# Patient Record
Sex: FEMALE | Race: WHITE | NOT HISPANIC OR LATINO | Employment: FULL TIME | URBAN - METROPOLITAN AREA
[De-identification: names, ages, dates, MRNs, and addresses within clinical notes are randomized per-mention and may not be internally consistent; named-entity substitution may affect disease eponyms.]

---

## 2017-02-03 ENCOUNTER — COMMUNICATION - HEALTHEAST (OUTPATIENT)
Dept: INTERNAL MEDICINE | Facility: CLINIC | Age: 52
End: 2017-02-03

## 2017-02-06 ENCOUNTER — RECORDS - HEALTHEAST (OUTPATIENT)
Dept: ADMINISTRATIVE | Facility: OTHER | Age: 52
End: 2017-02-06

## 2017-02-17 ENCOUNTER — HOSPITAL ENCOUNTER (OUTPATIENT)
Dept: MAMMOGRAPHY | Facility: CLINIC | Age: 52
Discharge: HOME | End: 2017-02-17
Attending: INTERNAL MEDICINE

## 2017-02-17 DIAGNOSIS — Z12.31 VISIT FOR SCREENING MAMMOGRAM: ICD-10-CM

## 2017-03-17 ENCOUNTER — OFFICE VISIT - HEALTHEAST (OUTPATIENT)
Dept: INTERNAL MEDICINE | Facility: CLINIC | Age: 52
End: 2017-03-17

## 2017-03-17 ENCOUNTER — RECORDS - HEALTHEAST (OUTPATIENT)
Dept: ADMINISTRATIVE | Facility: OTHER | Age: 52
End: 2017-03-17

## 2017-03-17 DIAGNOSIS — Z00.00 ROUTINE GENERAL MEDICAL EXAMINATION AT A HEALTH CARE FACILITY: ICD-10-CM

## 2017-03-17 LAB
CHOLEST SERPL-MCNC: 163 MG/DL
FASTING STATUS PATIENT QL REPORTED: YES
HDLC SERPL-MCNC: 64 MG/DL
LDLC SERPL CALC-MCNC: 89 MG/DL
TRIGL SERPL-MCNC: 52 MG/DL

## 2017-03-17 ASSESSMENT — MIFFLIN-ST. JEOR: SCORE: 1081.44

## 2017-03-23 LAB
BKR LAB AP ABNORMAL BLEEDING: NO
BKR LAB AP BIRTH CONTROL/HORMONES: NORMAL
BKR LAB AP CERVICAL APPEARANCE: NORMAL
BKR LAB AP GYN ADEQUACY: NORMAL
BKR LAB AP GYN INTERPRETATION: NORMAL
BKR LAB AP GYN OTHER FINDINGS: NORMAL
BKR LAB AP HPV REFLEX: NORMAL
BKR LAB AP LMP: NORMAL
BKR LAB AP PATIENT STATUS: NORMAL
BKR LAB AP PREVIOUS ABNORMAL: NORMAL
BKR LAB AP PREVIOUS NORMAL: NORMAL
HIGH RISK?: NO
HPV INTERPRETATION - HISTORICAL: NORMAL
HPV INTERPRETER - HISTORICAL: NORMAL
PATH REPORT.COMMENTS IMP SPEC: NORMAL
RESULT FLAG (HE HISTORICAL CONVERSION): NORMAL

## 2017-11-15 ENCOUNTER — OFFICE VISIT - HEALTHEAST (OUTPATIENT)
Dept: PODIATRY | Facility: CLINIC | Age: 52
End: 2017-11-15

## 2017-11-15 DIAGNOSIS — M72.2 PLANTAR FASCIITIS: ICD-10-CM

## 2017-11-16 ENCOUNTER — COMMUNICATION - HEALTHEAST (OUTPATIENT)
Dept: OTHER | Facility: CLINIC | Age: 52
End: 2017-11-16

## 2018-03-01 ENCOUNTER — HOSPITAL ENCOUNTER (OUTPATIENT)
Dept: MAMMOGRAPHY | Facility: CLINIC | Age: 53
Discharge: HOME | End: 2018-03-01
Attending: INTERNAL MEDICINE

## 2018-03-01 DIAGNOSIS — Z12.31 VISIT FOR SCREENING MAMMOGRAM: ICD-10-CM

## 2019-03-21 ENCOUNTER — HOSPITAL ENCOUNTER (OUTPATIENT)
Dept: MAMMOGRAPHY | Facility: CLINIC | Age: 54
Discharge: HOME | End: 2019-03-21
Attending: OBSTETRICS & GYNECOLOGY

## 2019-03-21 DIAGNOSIS — Z12.31 VISIT FOR SCREENING MAMMOGRAM: ICD-10-CM

## 2021-04-16 ENCOUNTER — HOSPITAL ENCOUNTER (OUTPATIENT)
Dept: MAMMOGRAPHY | Facility: CLINIC | Age: 56
Discharge: HOME | End: 2021-04-16
Attending: OBSTETRICS & GYNECOLOGY

## 2021-04-16 DIAGNOSIS — Z12.31 VISIT FOR SCREENING MAMMOGRAM: ICD-10-CM

## 2021-04-22 ENCOUNTER — RECORDS - HEALTHEAST (OUTPATIENT)
Dept: LAB | Facility: CLINIC | Age: 56
End: 2021-04-22

## 2021-04-22 LAB
CHOLEST SERPL-MCNC: 152 MG/DL
FASTING STATUS PATIENT QL REPORTED: NORMAL
HDLC SERPL-MCNC: 60 MG/DL
LDLC SERPL CALC-MCNC: 77 MG/DL
TRIGL SERPL-MCNC: 77 MG/DL

## 2021-04-23 LAB
25(OH)D3 SERPL-MCNC: 33.8 NG/ML (ref 30–80)
HPV SOURCE: NORMAL
HUMAN PAPILLOMA VIRUS 16 DNA: NEGATIVE
HUMAN PAPILLOMA VIRUS 18 DNA: NEGATIVE
HUMAN PAPILLOMA VIRUS FINAL DIAGNOSIS: NORMAL
HUMAN PAPILLOMA VIRUS OTHER HR: NEGATIVE
SPECIMEN DESCRIPTION: NORMAL

## 2021-04-28 LAB
BKR LAB AP ABNORMAL BLEEDING: NO
BKR LAB AP BIRTH CONTROL/HORMONES: NORMAL
BKR LAB AP CERVICAL APPEARANCE: NORMAL
BKR LAB AP GYN ADEQUACY: NORMAL
BKR LAB AP GYN INTERPRETATION: NORMAL
BKR LAB AP HPV REFLEX: NORMAL
BKR LAB AP LMP: NORMAL
BKR LAB AP PATIENT STATUS: NO
BKR LAB AP PREVIOUS ABNORMAL: NO
BKR LAB AP PREVIOUS NORMAL: 2019
HIGH RISK?: NO
PATH REPORT.COMMENTS IMP SPEC: NORMAL
RESULT FLAG (HE HISTORICAL CONVERSION): NORMAL

## 2021-05-24 ENCOUNTER — RECORDS - HEALTHEAST (OUTPATIENT)
Dept: ADMINISTRATIVE | Facility: CLINIC | Age: 56
End: 2021-05-24

## 2021-05-28 ENCOUNTER — RECORDS - HEALTHEAST (OUTPATIENT)
Dept: ADMINISTRATIVE | Facility: CLINIC | Age: 56
End: 2021-05-28

## 2021-05-29 ENCOUNTER — RECORDS - HEALTHEAST (OUTPATIENT)
Dept: ADMINISTRATIVE | Facility: CLINIC | Age: 56
End: 2021-05-29

## 2021-05-30 VITALS — BODY MASS INDEX: 20.71 KG/M2 | WEIGHT: 116.9 LBS | HEIGHT: 63 IN

## 2021-06-14 ENCOUNTER — COMMUNICATION - HEALTHEAST (OUTPATIENT)
Dept: PHYSICAL MEDICINE AND REHAB | Facility: CLINIC | Age: 56
End: 2021-06-14

## 2021-06-14 ENCOUNTER — HOSPITAL ENCOUNTER (EMERGENCY)
Dept: EMERGENCY MEDICINE | Facility: CLINIC | Age: 56
Discharge: HOME | End: 2021-06-14
Attending: STUDENT IN AN ORGANIZED HEALTH CARE EDUCATION/TRAINING PROGRAM
Payer: COMMERCIAL

## 2021-06-14 DIAGNOSIS — M54.2 NECK PAIN: ICD-10-CM

## 2021-06-14 DIAGNOSIS — M48.02 CERVICAL STENOSIS OF SPINAL CANAL: ICD-10-CM

## 2021-06-14 ASSESSMENT — MIFFLIN-ST. JEOR: SCORE: 1045.61

## 2021-06-15 ENCOUNTER — COMMUNICATION - HEALTHEAST (OUTPATIENT)
Dept: PHYSICAL MEDICINE AND REHAB | Facility: CLINIC | Age: 56
End: 2021-06-15

## 2021-06-15 ENCOUNTER — HOSPITAL ENCOUNTER (OUTPATIENT)
Dept: PHYSICAL MEDICINE AND REHAB | Facility: CLINIC | Age: 56
Discharge: HOME | End: 2021-06-15
Attending: PHYSICAL MEDICINE & REHABILITATION
Payer: COMMERCIAL

## 2021-06-15 DIAGNOSIS — M48.02 CERVICAL STENOSIS OF SPINAL CANAL: ICD-10-CM

## 2021-06-15 DIAGNOSIS — M54.12 CERVICAL RADICULITIS: ICD-10-CM

## 2021-06-15 DIAGNOSIS — M54.2 NECK PAIN: ICD-10-CM

## 2021-06-15 DIAGNOSIS — M48.02 FORAMINAL STENOSIS OF CERVICAL REGION: ICD-10-CM

## 2021-06-15 RX ORDER — OMEGA-3 FATTY ACIDS/FISH OIL 300-1000MG
400 CAPSULE ORAL EVERY 6 HOURS PRN
Status: SHIPPED | COMMUNITY
Start: 2021-06-15

## 2021-06-15 RX ORDER — HYDROCODONE BITARTRATE AND ACETAMINOPHEN 5; 325 MG/1; MG/1
TABLET ORAL
Qty: 21 TABLET | Refills: 0 | Status: SHIPPED | OUTPATIENT
Start: 2021-06-15 | End: 2023-02-06

## 2021-06-15 RX ORDER — NABUMETONE 500 MG/1
TABLET, FILM COATED ORAL
Qty: 90 TABLET | Refills: 1 | Status: SHIPPED | OUTPATIENT
Start: 2021-06-15

## 2021-06-15 RX ORDER — GABAPENTIN 300 MG/1
CAPSULE ORAL
Qty: 270 CAPSULE | Refills: 2 | Status: SHIPPED | OUTPATIENT
Start: 2021-06-15 | End: 2023-02-06

## 2021-06-15 ASSESSMENT — MIFFLIN-ST. JEOR: SCORE: 1041.98

## 2021-06-16 PROBLEM — N94.6 DYSMENORRHEA: Status: ACTIVE | Noted: 2019-04-19

## 2021-06-17 ENCOUNTER — COMMUNICATION - HEALTHEAST (OUTPATIENT)
Dept: PHYSICAL MEDICINE AND REHAB | Facility: CLINIC | Age: 56
End: 2021-06-17

## 2021-06-17 DIAGNOSIS — M54.12 CERVICAL RADICULITIS: ICD-10-CM

## 2021-06-18 ENCOUNTER — HOSPITAL ENCOUNTER (OUTPATIENT)
Dept: PHYSICAL MEDICINE AND REHAB | Facility: CLINIC | Age: 56
Discharge: HOME | End: 2021-06-18
Attending: PHYSICAL MEDICINE & REHABILITATION
Payer: COMMERCIAL

## 2021-06-18 DIAGNOSIS — M54.12 CERVICAL RADICULITIS: ICD-10-CM

## 2021-06-21 ENCOUNTER — COMMUNICATION - HEALTHEAST (OUTPATIENT)
Dept: PHYSICAL MEDICINE AND REHAB | Facility: CLINIC | Age: 56
End: 2021-06-21

## 2021-06-22 ENCOUNTER — HOSPITAL ENCOUNTER (OUTPATIENT)
Dept: PHYSICAL MEDICINE AND REHAB | Facility: CLINIC | Age: 56
Discharge: HOME | End: 2021-06-22
Attending: PHYSICAL MEDICINE & REHABILITATION
Payer: COMMERCIAL

## 2021-06-22 DIAGNOSIS — M48.02 FORAMINAL STENOSIS OF CERVICAL REGION: ICD-10-CM

## 2021-06-22 DIAGNOSIS — M54.12 CERVICAL RADICULITIS: ICD-10-CM

## 2021-06-22 RX ORDER — HYDROCODONE BITARTRATE AND ACETAMINOPHEN 5; 325 MG/1; MG/1
TABLET ORAL
Qty: 35 TABLET | Refills: 0 | Status: SHIPPED | OUTPATIENT
Start: 2021-06-22 | End: 2023-02-06

## 2021-06-25 ENCOUNTER — COMMUNICATION - HEALTHEAST (OUTPATIENT)
Dept: PHYSICAL MEDICINE AND REHAB | Facility: CLINIC | Age: 56
End: 2021-06-25

## 2021-06-25 NOTE — ED NOTES
Patient on call light complaining of pain.  Provider notified and ordered more pain mediations, see MAR

## 2021-06-25 NOTE — ED NOTES
Patient given pre MRI medications, guarding area with counter pressure with left arm to right shoulder area.  iram

## 2021-06-25 NOTE — ED NOTES
"Patient declined 2nd Percocet, stated that \"it didn't work, could we get something else\".  Wasted medicaitons and informed provider that patient would like something else, \"maybe a shot of something\"    Ice bag given to patient.  kjs  "

## 2021-06-25 NOTE — ED NOTES
Primary assessment done by CAMILA Summers PA-C.  Please refer to those notes for assessment.  iram

## 2021-06-25 NOTE — ED NOTES
Called MRI, patient is #2 in line, room 10 is first.  Patient will be updated.  Saw  leave department.  iram

## 2021-06-25 NOTE — ED NOTES
Pt aware that she may not drive home due to medications administered in the ER, patient reports  will be ride home.

## 2021-06-25 NOTE — TELEPHONE ENCOUNTER
Patience Summers from  ER called asking physician to review MRI with her.  MRI shows C5-6 and C6-7 foraminal stenosis (worse on the right at both levels).  Patient will be referred to spine center and spine center staff called her to schedule (she was offered appointment on 6-15-21 but then changed to 6-16-21).  ER will prescribe Gabapentin and oral steroids.

## 2021-06-25 NOTE — ED NOTES
checking in, wishing to leave and be called when she needs to be picked up.  Asking about purse, if he should take it or if she should keep it.  Advised whatever her wishes are.  Maybe just take her wallet with him was my suggestion.  iram

## 2021-06-26 NOTE — PATIENT INSTRUCTIONS - HE
Elma,  I am sorry that you are continuing to have pain.  I hope that with a little more time, the injection will help to give you some good relief.    The meantime I have refilled your Norco.  Norco 5/325 mg, 1 tablet every 4-6 hours as needed for pain, number 35 tablets with no refills was provided today..  Please lock this medication up when you are not taking it.  Do not share this medication with other people.  Do not increase the dose without permission from your physician.  Do not drink alcohol while you take this medication as this can lead to death.  Do not take other pain medications without approval from your physician or this can also lead to death.  If you need a refill of this medication, you must come in to clinic by appointment.  Please call if you have any questions on how to take this medication.    I have offered you a surgery referral/consultation to discuss your surgery options.  Talking with a surgeon does not obligate you to have surgery.  If you would like to move forward with this, please let me know and I would be happy to place a referral to one of the neurosurgeons.  I highly recommend Dr. Dillon Hurtado or Dr. Cheri Prakash.    If you would prefer to try a second injection, I would recommend a right C5-6 transforaminal epidural steroid injection (1 level higher than where you had your last injection).  This level also showed significant narrowing of the opening where the nerve comes out at this level.    A nurse will call you on Friday to see how you are doing. Please do not hesitate to contact the clinic at [phone] if you have any questions/concerns or any worsening of your pain prior to that time. You are also welcome to contact me via [facility] (I am out of the office Thursday through Monday, but other providers will be covering for me).

## 2021-06-26 NOTE — ED PROVIDER NOTES
EMERGENCY DEPARTMENT ENCOUNTER      NAME: Elma Jorgensen  AGE: 55 y.o. female  YOB: 1965  MRN: [id number]  EVALUATION DATE & TIME: 6/14/2021  8:52 AM    PCP: Brenda Ryan MD    ED PROVIDER: Gaby Summers PA-C      Chief Complaint   Patient presents with     Neck Pain         FINAL IMPRESSION:  1. Cervical stenosis of spinal canal    2. Neck pain          MEDICAL DECISION MAKING:    Pertinent Labs & Imaging studies reviewed. (See chart for details)  55 y.o.otherwise healthy right handed female presents to the Emergency Department for evaluation of right arm tingling, pain ongoing x 3 days which began suddenly without injury or trauma. Pt reports remote hx of right shoulder pain but never to this extent with radiation from neck to fingertips.     On exam she is uncomfortable appearing, pacing the room. Vitals are WNL. There is objectively decreased strength on the affected right side when compared to the left. Sensation and distal pulses intact.     Differential diagnosis includes cervical radiculopathy, disc herniation, compression fx, epidural abscess, muscle spasm, malignancy. Pt initially given percocet and lido derm patch with some relief to pain. As she was await MRI, pain returned so ativan then given to help with MRI comfort. After imaging she required further pain control, flexeril and toradol given.     MRI cervical spine reveals significant stenosis of c5-c6 and c6-c7 which appears to cause nerve root impingement. This finding correlates with her pain. Discussed with spine care who recommends gabapentin 300mg at bedtime and medrol dosepak. Will also provide small quantity of narcotic pain medication. Referral placed to spine clinic, they will attempt to see her tomorrow.     Pt is appropriate for outpatient management. Provisional nature of today's diagnosis was discussed and strict return precautions were given. Pt expressed understanding and She was discharged to home in good  condition.     0 minutes of critical care time     ED COURSE  9:23 AM  Met and evaluated patient. Discussed ED plan.   10:54 AM Checked in on the patient. Patient feeling better after lidocaine patch and oxycodone.   1:36 PM  Staffed the patient with Dr. Slade  1:41 PM Updated the patient.  2:04 PM Spoke with Dr. Saini of spine care  2:15 PM discharged to home in good condition by RN.       MEDICATIONS GIVEN IN THE EMERGENCY:  Medications   oxyCODONE-acetaminophen 5-325 mg 1 tablet (PERCOCET/ENDOCET) (1 tablet Oral Given 6/14/21 0920)   LORazepam tablet 0.5 mg (ATIVAN) (0.5 mg Oral Given 6/14/21 1201)   cyclobenzaprine tablet 10 mg (FLEXERIL) (10 mg Oral Given 6/14/21 1407)   ketorolac injection 15 mg (TORADOL) (15 mg Intramuscular Given 6/14/21 1410)       NEW PRESCRIPTIONS STARTED AT TODAY'S ER VISIT  Discharge Medication List as of 6/14/2021  2:23 PM      START taking these medications    Details   methylPREDNISolone (MEDROL DOSEPACK) 4 mg tablet Follow package directions, Normal      gabapentin (NEURONTIN) 300 MG capsule Take 1 capsule (300 mg total) by mouth at bedtime. Take 1 capsule (300mg total) at night before bed, Starting Mon 6/14/2021, Normal      HYDROcodone-acetaminophen 5-325 mg per tablet Take 1 tablet by mouth every 4 (four) hours as needed for pain., Starting Mon 6/14/2021, Print                =================================================================    HPI    Patient information was obtained from: Patient    Use of Intrepreter: N/A         Elma Jorgensen is a 55 y.o. female with no relevant past medical history, who presents to the emergency department for evaluation of neck, right shoulder, and right arm pain.    Since Friday 6/11/21 (3 days ago), patient reports major, constant neck and right shoulder pain that radiates down her right arm to her fingers. She reports constant numbness and tingling and having weak  strength in her right hand. She reports Thursday (1 day before onset  "of pain) she felt \"something\" in her right neck and shoulder area. Patient reports chest and bicep lifting on Friday morning and felt fine during exercise. She reports at 5:00 PM on Friday was when her constant pain started.  reports pain was so bad \"she couldn't make it another day.\"    She mentions she has felt similar pain in the past in her right shoulder, but it has not radiated down her right arm like it is currently.Patient reports taking ibuprofen, tylenol, applying ice and heat with no relief. She mentions going to PT for right shoulder degeneration 10 years ago. She denies any recent injuries. She denies any fevers. She has no other symptoms at this time.    REVIEW OF SYSTEMS   Review of Systems   Constitutional: Negative for activity change, fatigue, fever and unexpected weight change.   HENT: Negative for congestion, sore throat and voice change.    Eyes: Negative for visual disturbance.   Respiratory: Negative for cough, chest tightness, shortness of breath and wheezing.    Cardiovascular: Negative for chest pain, palpitations and leg swelling.   Gastrointestinal: Negative for abdominal pain, blood in stool, constipation, diarrhea, nausea and vomiting.   Genitourinary: Negative for difficulty urinating, dysuria and hematuria.   Musculoskeletal: Negative for arthralgias and joint swelling.        Positive right shoulder pain. Positive right arm pain. Positive numbness and tingling of shoulder and arm.   Skin: Negative for pallor and rash.   Neurological: Negative for dizziness, tremors, weakness, light-headedness and headaches.   Hematological: Negative for adenopathy.   Psychiatric/Behavioral: Negative for agitation and confusion. The patient is not nervous/anxious.    All other systems reviewed and are negative.        PAST MEDICAL HISTORY:  No past medical history on file.    PAST SURGICAL HISTORY:  Past Surgical History:   Procedure Laterality Date     NO PAST SURGERIES             CURRENT " MEDICATIONS:    No current facility-administered medications on file prior to encounter.      Current Outpatient Medications on File Prior to Encounter   Medication Sig     calcium carbonate (OS-IESHA) 600 mg (1,500 mg) tablet Take 600 mg by mouth 2 (two) times a day with meals.     cholecalciferol, vitamin D3, (VITAMIN D3) 5,000 unit Tab Take by mouth.     multivitamin with minerals (THERA-M) 9 mg iron-400 mcg Tab tablet Take 1 tablet by mouth daily.     OMEGA-3S/DHA/EPA/FISH OIL (OMEGA 3 ORAL) Take by mouth.       ALLERGIES:  No Known Allergies    FAMILY HISTORY:  Family History   Problem Relation Age of Onset     Hypertension Mother      Osteoporosis Sister      Ovarian cancer Maternal Grandmother      Breast cancer Paternal Aunt 60     Colon cancer Neg Hx        SOCIAL HISTORY:   Social History     Socioeconomic History     Marital status:      Spouse name: Not on file     Number of children: 1     Years of education: Not on file     Highest education level: Not on file   Occupational History     Not on file   Social Needs     Financial resource strain: Not on file     Food insecurity     Worry: Not on file     Inability: Not on file     Transportation needs     Medical: Not on file     Non-medical: Not on file   Tobacco Use     Smoking status: Never Smoker     Smokeless tobacco: Never Used   Substance and Sexual Activity     Alcohol use: Not on file     Drug use: Not on file     Sexual activity: Not on file   Lifestyle     Physical activity     Days per week: Not on file     Minutes per session: Not on file     Stress: Not on file   Relationships     Social connections     Talks on phone: Not on file     Gets together: Not on file     Attends Baptism service: Not on file     Active member of club or organization: Not on file     Attends meetings of clubs or organizations: Not on file     Relationship status: Not on file     Intimate partner violence     Fear of current or ex partner: Not on file      "Emotionally abused: Not on file     Physically abused: Not on file     Forced sexual activity: Not on file   Other Topics Concern     Not on file   Social History Narrative     Not on file         VITALS:  Patient Vitals for the past 24 hrs:   BP Temp Temp src Pulse Resp SpO2 Height Weight   06/14/21 0856 135/83 98  F (36.7  C) Oral 89 20 99 % 5' 2.5\" (1.588 m) 109 lb (49.4 kg)       PHYSICAL EXAM    Constitutional: She is oriented to person, place, and time. She appears well-developed and well-nourished. No distress.   HENT:   Head: Normocephalic and atraumatic.   Eyes: Conjunctivae are normal. No scleral icterus.   Neck: Normal range of motion.   Musculoskeletal: Normal range of motion.         General: No tenderness, deformity or edema.      Comments: There is tenderness to palpation of right trapezes.    Neurological: She is alert and oriented to person, place, and time. No cranial nerve deficit.   Right arm weakness with  strength 3/5. Weakness with external rotation, forearm flexion and extension. No midline cervical spine tenderness to palpation. No TTP thoracic, lumber, and cervical spine.     Skin: Skin is warm and dry. No rash noted. She is not diaphoretic. No erythema.   Psychiatric: She has a normal mood and affect. Her behavior is normal.   Vitals reviewed.      LAB:  All pertinent labs reviewed and interpreted.    Labs Reviewed - No data to display      RADIOLOGY:  Reviewed all pertinent imaging. Please see official radiology report    MR Cervical Spine Without Contrast   Final Result   1.  Degeneration of the mid and lower cervical discs.   2.  Moderate central spinal stenosis at C6-C7 and mild spinal stenosis at C5-C6.   3.  Disc/osteophyte complexes cause foraminal stenosis which is moderate to severe on the right at C5-C6 and severe on the right at C6-C7. This may be affecting the C6 or C7 nerve roots.   4.  Moderate foraminal narrowing on the left at C5-C6.               PROCEDURES: "   None      I, Amadou Roth, am serving as a scribe to document services personally performed by Gaby Summers PA-C based on my observation and the provider's statements to me. I, Gaby Summers PA-C attest that Amadou Roth is acting in a scribe capacity, has observed my performance of the services and has documented them in accordance with my direction.      Gaby Summers PA-C  Emergency Medicine  [facility] EMERGENCY ROOM  [address] [address]  Dept: [phone]  Loc: [phone]       Gaby Summers PA-C  06/15/21 1955

## 2021-06-26 NOTE — TELEPHONE ENCOUNTER
PSP: Dr. Saini   Last clinic visit:  6/15/21 new consult; 6/18/21 Right C6-C7 TFESI   Reason for call: left message on nurse line at 1217 stating still having quite a bit of pain in neck, shoulder and right arm. Has #2 Vicodin left and is taking 1 every 5-6 hours.   Clinical information:  Returned patient's call. Discussed other medications that she has and is taking. States she is taking Nabumetone 1 every 8 hours. Also taking Vicodin. States her pain returns to her right shoulder, down bicep, down the forearm and on to top of hand about 5 hours after taking a Vicodin.   Advice given to patient: Reviewed with pt that some patients can find relief as early as 2 days after an injection while other patients don't see relief for up to 2-3 weeks after an injection. Stated understanding. Explained the medication she is requesting a refill on does require an appointment, either in person or video visit. Transferred to scheduling to make the appointment for tomorrow AM.   Provider to address: BRITT, seeing you 6/22 AM

## 2021-06-26 NOTE — ED PROVIDER NOTES
"I am seeing this patient along with Gaby Summers PA-C.  I, Josette Slade MD have reviewed the documentation, personally taken the patient's history, performed an exam and agree with the physical findings, diagnosis and management plan. I have taken a history, review of systems, physical exam, and I concur with his documentation of Elma Jorgensen.    1:32 PM Gaby Summers PA-C staffed patient with me. Discussed patients presentation and plan of care. Agreeable with plan.   1:35 PM I saw the patient wearing an eye shield, surgical mask, and gloves.      HPI: Elma Jorgensen is a 55 y.o. female with no relevant past medical history, who presents to the emergency department for evaluation of neck, right shoulder, and right arm pain.     Since Friday 6/11/21 (3 days ago), patient endorses constant neck and right shoulder pain that radiates down her right arm to her fingers. She reports constant numbness and tingling and having weak  strength in her right hand. Thursday (1 day before onset of pain) she felt \"something\" in her right neck and shoulder area. Patient reports chest and bicep lifting on Friday morning. She mentions she has felt similar pain in the past in her right shoulder, but it has not radiated down her right arm like it is currently. Medicated with ibuprofen, tylenol, applying ice and heat. Denies any relief. She denies any recent injuries. Denies any other complaints at this time.     ROS: A ten-point ROS was performed. All pertinent positives noted in the HPI.  All other systems reviewed and are negative except as noted.    PEx:  General: Appears uncomfortable, tearful  HEENT: No gross facial asymmetry. No external evidence of trauma on visual inspection.  Neck: Moving freely.  Chest/Pulm: No respiratory distress. Breathing comfortably on room air.  CV: Regular rate. Extremities are warm and well perfused.  Abdomen: Soft and non-distended without tenderness to palpation.  MSK/Extremities: No " deformities  Skin: No visible lacerations or abrasions.  Neuro: Alert, conversant, appropriate. CN II-XII grossly intact. Right upper extremity weakness 4/5 compared to 5/5 in the LUE.   Psych: Behavior normal.    MDM:  55 y.o. old female who presents to the ED for evaluation of neck pain.   History and physical examination as documented. Vital signs reassuring.    Due to the weakness on exam an MRI was obtained. This showed disc/osteophyte complexes causing foraminal stenosis which is moderate to severe on the right at C5-C6 and severe on the right at C6-C7. This may be affecting the C6 or C7 nerve roots.     Due to the weakness on exam, we discussed the case with spine on call who recommended that she be seen in clinic tomorrow for f/u.     Given directions to alternate Tylenol and ibuprofen.  Given gabapentin for sleep at night.  Also given Medrol Dosepak.  Oxycodone for breakthrough pain.    Discharged in stable condition with return precautions placed.         1. Cervical stenosis of spinal canal  gabapentin (NEURONTIN) 300 MG capsule    methylPREDNISolone (MEDROL DOSEPACK) 4 mg tablet    HYDROcodone-acetaminophen 5-325 mg per tablet    c6-c7        Josette Slade MD  Emergency medicine    The creation of this record is based on the scribe s observations of the work being performed by Josette Slade MD and the provider s statements to them. It was created on his behalf by Nawaf Dorsey, a trained medical scribe. This document has been checked and approved by the attending provider.          Josette Slade MD  06/14/21 0475

## 2021-06-26 NOTE — PROGRESS NOTES
ASSESSMENT: Elma Jorgensen is a 55 y.o. female who is previously healthy who presents today for new patient evaluation of acute and severe right-sided neck pain with right radicular arm pain.  Her MRI does show significant right C5-6 and C6-7 foraminal stenosis which is likely the cause of her pain.  Is difficult to tell if her pain is coming from 1 or both levels, as her symptoms tend to concentrate in the right index and middle finger (which is both C6 and C7).  Her MRI findings are slightly worse at the C6-7 level.  She is neurologically intact and without any red or myelopathic flag symptoms.    NDI:  82 %  WHO-5:  16 (The patient is not interested in behavioral health therapy)    PLAN:  A shared decision making model was used.  The patient's values and choices were respected.  The following represents what was discussed and decided upon by the physician and the patient.   She was accompanied by her , Jeramy, for the visit.    1.  DIAGNOSTIC TESTS: The patient's MRI of the cervical spine from June 14 of 2021 was personally reviewed today by the physician with the physician performing her own interpretation.  Patient does have significant right C5-6 and very severe right C6-7 foraminal stenosis.  There is moderate central canal stenosis at the C6-7 level with mild central canal stenosis at the C5-6 level.  Otherwise this is a largely normal MRI for her age.  The images were shown to the patient the findings were explained.  2.  PHYSICAL THERAPY: I did recommend physical therapy for the patient.  She would like to hold off on this for now, she is self-pay.  She asked if I had any handouts for neck exercises.  I checked and I do not.  I did tell her that she can look online for physical therapy exercises.  She should look at repeatable site such as [facility] and [facility].  If she changes her mind and would like an order for physical therapy, the following order can be provided:  Diagnosis:  Neck pain,  right cervical radiculitis from foraminal stenosis  Contraindications:  None  Please work on postural training, neck strengthening (particularly neck and upper/mid back strengthening), neck range of motion, neck stretching.  She should be taught nerve glide/flossing exercises.  Can try cervical traction.  4-6 visits.  3.  MEDICATIONS:    -Did encourage her to complete the Medrol Dosepak that she is prescribed in the emergency room.  I did explain that while she is taking the Medrol Dosepak she should not take over-the-counter NSAIDs.  -After she completes the Medrol Dosepak, she can take nabumetone 500 mg every 8 hours as needed for pain.  She is encouraged to take this medication with food/water to prevent any stomach upset.  -A  check was run today.  It did not show anything, despite the fact that she received a prescription from the emergency room) and she is deemed appropriate for short-term opiate use for acute pain management.  Norco 5/325 mg 1 tablet every 6-8 hours as needed for severe pain, number 21 tablets with no refills was given today.  Further instructions regarding this type of medication were provided in the after visit summary.  -Discussed Gabapentin/Neurontin (nerve pain medication) mechanism of action as well as potential side effects (drowsiness/dizziness) with the patient.  The patient wanted to try this medication so Gabapentin 300 mg was prescribed.  A chart was given with how to increase the dose to a maximum of 3 tablets three times a day.  The lowest therapeutic dose should be used.   The patient is asked to call the clinic if there are any side effects to the Gabapentin or if questions arise as to how to increase the dose.  4.  INTERVENTIONS:    -Given that her pain is very acute, I would like to hold off on any injections to see how she does with conservative treatment first.  If she fails to have relief with conservative treatment, I would recommend starting with a right C6-7  transforaminal epidural steroid injection.  This level did look slightly worse on her MRI compared to the C5-6 level.  If she fails to have relief with a right C6-7 TFESI, I would recommend a right C5-6 TFESI.  5.  PATIENT EDUCATION:   -The diagnosis of the cervical foraminal stenosis was discussed with her.  I reassured her that while this is very painful right now, her symptoms should improve with the treatment plan.  -She did have questions about the cervical stenosis.  I explained that this is from degenerative changes, most likely related to age and genetics.  This was not definitively caused by lifting weights.  I would encourage her to continue being active and continue lifting weights.  -All of her questions were answered to her satisfaction today.  She was in agreement with the treatment plan.  6.  FOLLOW-UP:   Patient is going to be called by nurse navigation on Friday morning to check in and see how she is doing going into the weekend.  The nurse is asked to relay the status to the physician on Friday.  If there are any questions/concerns or any significant worsening of pain prior to that time, the patient is asked to call the clinic via the nurse navigation line or via [employer].         SUBJECTIVE:  Elma Jorgensen  Is a 55 y.o. female who presents today for new patient evaluation of neck pain and right radicular arm pain.  The patient reports that the pain just started on June 11 of 2021.  She thinks that maybe it was caused after lifting weights.  She has developed significant right-sided neck pain with pain radiating down the anterior arm into the flexor forearm and going into the right hand.  The index finger and middle fingers are mainly affected on the right hand.  No symptoms on the left side.  She does feel like she is weak.  She did go to the emergency room yesterday because her pain was so severe.  They did give her Medrol Dosepak and told her to take gabapentin at night.  She does think that this  is may be helping somewhat.  She says is hard to tell.  Again she denies any symptoms on the left side.  Her right-sided neck pain is worse with lifting.  She does feel little bit better she can keep her head tilted to the left side.  She is not had any other treatment such as physical therapy.  She denies any history of spinal surgeries.    She does state that she had some neck pain back in 2009.  She had an MRI of her neck.  At that time she was only having pain in the neck and the shoulder, was not going down the arm and it was not severe as it is now.    Medications:  Reviewed and correct in the chart.     Allergies: Reviewed and NKDA per patient.    PMH:  Reviewed and patient is healthy.    PSH:  Reviewed and she denies any previous surgeries.    Family History:  Reviewed and significant for lung cancer in her mother who was a smoker, hypertension in her father.    Social History: Patient is  and is a gym owner/.  She drinks alcohol about once a month.  She denies any tobacco or illicit drug use.    ROS: Positive for muscle pain as stated in HPI.  Specifically negative for bowel/bladder retention, dysphagia, imbalance/falls, difficulty with fine motor skills.  Otherwise 13 systems reviewed are negative.  Please see the patient's intake questionnaire from today for details.      OBJECTIVE:  PHYSICAL EXAMINATION:  CONSTITUTIONAL:  Vital signs as above.  No acute distress.  The patient is well nourished and well groomed.  PSYCHIATRIC:  The patient is awake, alert, oriented to person, place, time and answering questions appropriately with clear speech.    HEENT:  Sclera are non-injected.  Extraocular muscles are intact. Anterior neck is supple.  SKIN:  Skin over the face, bilateral upper extremities, and posterior torso is clean, dry, intact without rashes.  LYMPH NODES:  No palpable or tender anterior/posterior cervical, submandibular, or supraclavicular lymph nodes.    MUSCLE STRENGTH:  5/5  strength for the bilateral shoulder abductors, elbow flexors/extensors, wrist extensors, finger flexors/abductors.  NEURO:    2/4 symmetric biceps, brachioradialis, triceps reflexes bilaterally.  Sensation to light touch is intact in all of the digits of both upper extremities. Negative Branch's bilaterally.    VASCULAR:  2/4 radial pulses bilaterally.  Warm upper limbs bilaterally.  Capillary refill in the upper extremities is less than 1 second.  MUSCULOSKELETAL: Positive Spurling's test on the right side for reproduction of right neck pain with radiation down the right arm.  Negative Spurling's test on the left side.  She does have significant pain with cervical extension, even just going from a flexed position to the neutral position.  Trying to extend past neutral causes significant pain.  She does not have any significant tenderness to palpation over the bilateral cervical paraspinal muscles or upper trapezius muscles.  She does have increased pain with right cervical sidebending with improvement in pain with left cervical sidebending.  She does note some pain with bilateral rotation, worse to the right compared to the left.  She does have restricted range of motion with both sidebending and rotation of the cervical spine.

## 2021-06-26 NOTE — TELEPHONE ENCOUNTER
Call placed to pt to discuss. Pt reports she would like to proceed with Right C6-7 TFESI as recommended by Dr. Saini. Discussed injection with the pt and answered all questions. Pt reports she would like to be scheduled for this but she will call tomorrow AM if she does change her mind.     Pt reports that she will be paying out of pocket for this injection. Pt was transferred to scheduling to make appt for tomorrow.

## 2021-06-26 NOTE — PATIENT INSTRUCTIONS - HE
Elma,    It was nice to meet you and Jeramy.  Your MRI shows that you have central canal stenosis at the C5-6 and C6-7 levels.  Your pain is likely coming from the foraminal stenosis (narrowing of the side openings) where the nerves exit your spine.  You have foraminal stenosis on the right at the C5-6 and C6-7 levels.        Please complete the oral steroids (methylprednisolone/Medrol Dosepak) that you were given in the ER.  You should not take any ibuprofen/Advil/Motrin/Aleve/naproxen while you are taking the oral steroids.      Nabumetone (which is an anti-inflammatory) medication is prescribed today for you to take after you finish the medrol dosepack.  Take 1 tablet 3 times a day as needed for pain.  This medication should be taken with food and water to prevent any stomach upset.  Do not take ibuprofen/Advil/Motrin/Aleve/naproxen while you take Nabumetone.  Please call if you have any side effects to  Nabumetone.       Norco 5/325 mg, 1 tablet every 6-8 hours as needed for pain, number 21 tablets with no refills was given today..  Please lock this medication up when you are not taking it.  Do not share this medication with other people.  Do not increase the dose without permission from your physician.  Do not drink alcohol while you take this medication as this can lead to death.  Do not take other pain medications without approval from your physician or this can also lead to death.  If you need a refill of this medication, you must come in to clinic by appointment.  Please call if you have any questions on how to take this medication.    Gabapentin (nerve pain medication) was prescribed today.  Please use the chart to increase the dose to an amount that controls your pain (do not exceed 3 tablets three times a day).  The chart is just a guide.  You can go up on the dose more quickly or more slowly depending on how you feel on the medication.  If you have any questions on how to increase the dose or any side  effects to this medication, please call the clinic.    Gabapentin 300mg Dosing Chart    DATE  MORNING AFTERNOON BEDTIME    Day 1 0 0 1    Day 2 0 0 1    Day 3 0 0 1    Day 4 1 0 1    Day 5 1 0 1    Day 6 1 0 1    Day 7 1 1 1    Day 8 1 1 1    Day 9 1 1 1    Day 10 1 1 2    Day 11 1 1 2    Day 12 1 1 2    Day 13 2 1 2    Day 14 2 1 2    Day 15 2 1 2    Day 16 2 2 2    Day 17 2 2 2    Day 18 2 2 2    Day 19 2 2 3    Day 20 2 2 3    Day 21 2 2 3    Day 22 3 2 3    Day 23 3 2 3    Day 24 3 2 3    Day 25 3 3 3    Day 26 3 3 3    Day 27 3 3 3     Continue medication, taking 3 capsules three times daily    Please call if you have any questions regarding how to take your medication  Clinic Phone # [phone]    A nurse will call you on Friday to see how you are doing. Please do not hesitate to contact the clinic at [phone] if you have any questions/concerns or any worsening of your pain prior to that time.  If you would like to move forward with physical therapy or the right C6-7 epidural steroid injection, please let me know.  Otherwise we will ask about this on Friday depending on how you feel with the medications.  You are also welcome to contact me via "[id number]".    Thanks, Elma!  Dr. Saini

## 2021-06-26 NOTE — PROGRESS NOTES
"Elma Jorgensen is a 55 y.o. female who is being evaluated via a billable video visit.      The patient has been notified of following:     \"This video visit will be conducted via a call between you and your physician/provider. We have found that certain health care needs can be provided without the need for an in-person physical exam.  This service lets us provide the care you need with a video conversation.  If a prescription is necessary we can send it directly to your pharmacy.  If lab work is needed we can place an order for that and you can then stop by our lab to have the test done at a later time.    Video visits are billed at different rates depending on your insurance coverage. Please reach out to your insurance provider with any questions.    If during the course of the call the physician/provider feels a video visit is not appropriate, you will not be charged for this service.\"    Patient has given verbal consent to a Video visit? Yes    Patient would like to receive their AVS by [employer]    Patient would like the video invitation sent by: Ernst    Will anyone else be joining your video visit? No        Video Start Time: 8:42      Video-Visit Details    Type of service:  Video Visit    Video End Time (time video stopped): 8:51 (total time 8 minutes)  Originating Location (pt. Location): Home    Distant Location (provider location):  [facility]     Platform used for Video Visit: Ernst    Assessment:   Elma Jorgensen is a 55 y.o. y.o. female who is previously healthy who presents today for follow-up regarding acute and severe right-sided neck pain with right radicular arm pain.  Her MRI shows significant right C5-6 and C6-7 foraminal stenosis.  In reviewing the images with the radiologist, it is thought that the more acute findings at the C6-7 level indicates that her current pain is coming from this level as opposed to the C5-6 level, though it is possible that the pain is coming from the C5-6 " level or from both levels.  Her symptoms concentrate in the right index and middle fingers.  She remains without any red flag or myelopathic signs/symptoms.  She is status post a right C6-7 transforaminal epidural steroid injection on June 18 of 2021.  She has not noticed any significant relief yet.  She continues to have severe pain, mainly in the arm.  Without any red flag or myelopathic signs/symptoms      PSP:  Dr. Saini     Plan:     A shared decision making plan was used.  The patient's values and choices were respected.  The following represents what was discussed and decided upon by the physician and the patient.      1.  DIAGNOSTIC TESTS: The patient's MRI of the cervical spine from June 14 of 2021 was personally reviewed today by the physician with the physician performing her own interpretation.  There is significant right C5-6 and severe right C6-7 foraminal stenosis.  There is moderate central canal stenosis seen at the C6-7 level with mild central canal stenosis is seen at the C5-6 level.  -No further diagnostic testing necessary at this time.  2.  PHYSICAL THERAPY: Did offer her physical therapy, but she would like to consider this.  I did give her the names of Cheri Looney, Shellie Davis, and Vickey Lopez at the [facility] re[facility].  She can call if she would like an order.  The following order can be provided.  Diagnosis: Cervical radiculitis, cervical foraminal stenosis  Please work on neck strengthening, range of motion, stretching with nerve glide/flossing exercises and home exercise program.  Can try cervical traction as tolerated.  4-6 visits.  3.  MEDICATIONS:  -She is encouraged to continue taking the nabumetone 500 mg up to 3 times a day as needed for pain.  -  The patient is encouraged to continue titrating up on the gabapentin.  She is currently taking 300 mg 3 times a day.  I did encourage her to titrate faster than what the chart says.  She can go up by 1 tablet every day as long  as she is not having significant side effects.  She can increase to 900 mg at bedtime tonight, given that she is having significant pain with trying to sleep.  -She did request a refill of her Vicodin/Norco medication.  I did run a  check and she is deemed low risk and appropriate for short-term opiate use for acute pain management.  Norco 5/325 mg 1 tablet every 4-6 hours as needed for pain, number 35 tablets with no refills is provided today.  I anticipate that this should last her at least a week.  She is encouraged to try and use this sparingly.  Further instructions regarding this medication were provided in the after visit summary.  4.  INTERVENTIONS: I did explain that it can take a minimum of 7 days before the injection can start working.  She is encouraged to give this injection more time.  -I did explain that she would be a candidate for right C5-6 transforaminal epidural steroid injection if she fails to have relief with the right C6-7 transforaminal epidural steroid injection.  5.  PATIENT EDUCATION:    -I did offer her referral to neurosurgery today to discuss her surgical options.  She would like to hold on this for now.  I did give her the names of Cheri Prakash and Dillon Gómez if she would like to proceed with this.  6.  FOLLOW-UP: The patient will be contacted by nurse navigation on Friday.  If there are any questions/concerns or any significant worsening of pain prior to that time, the patient is asked to call the clinic via the nurse navigation line or via [employer].      Subjective:     Elma Jorgensen is a 55 y.o. female who presents today for follow-up regarding severe right arm pain secondary to foraminal stenosis.  She is status post a right C6-7 transforaminal epidural steroid injection on June 18 of 2021.  At this time she is not noticing any relief yet from the injection.  She states that she has very mild neck pain most of the pain tends to concentrate in the right arm going all the way  down into the hand and the fingers.  It is just on the right side, she denies any symptoms on the left side.  She rates her pain today at a 2 out of 10.  At worst it is a 10 out of 10.  At best is a 1 out of 10.  She is not really sure what makes her pain worse, it is fairly constant.  She does feel like the medications are helping somewhat.  She is up to gabapentin 300 mg 3 times a day.  She is noticing some slight fatigue, but no major side effects.  She does feel that the Vicodin is helpful.  She says that it last for about 4-1/2 hours, she tries to wait 5 or 6 hours in between doses if possible, but the pain has been severe.    Past medical history is reviewed and is unchanged for any new medical diagnoses in the interim.      Family history is reviewed and is unchanged in the interim.        Review of Systems:  Positive for numbness, tingling, weakness of the right arm/hand.  Positive for pain that makes it difficult to sleep at night..  Pertinent negatives include no fevers, chills, unexplained weight loss, bowel incontinence, bladder incontinence, trips, stumbles, falls.  All others reviewed and are negative.     Objective:   CONSTITUTIONAL:  Vital signs as above.  No acute distress.  The patient is well nourished and well groomed.    PSYCHIATRIC:  The patient is awake, alert, oriented to person, place and time.  The patient is answering questions appropriately with clear speech.  Normal affect.  SKIN:  Skin over the face, neck bilateral upper extremities is clean, dry, intact without rashes.  MUSCULOSKELETAL: The patient looks uncomfortable.  She sits holding her right arm with her left hand through most of the visit.  She does not have any abnormal posturing of her head/neck, though she does slightly tilt her neck to the left side.  She is without any dyskinetic movements.

## 2021-06-26 NOTE — PATIENT INSTRUCTIONS - HE
**CO[facility] OF CARE #:  [phone]      DISCHARGE INSTRUCTIONS    During office hours (8:00 a.m.- 4:00 p.m.) questions or concerns may be answered  by calling [facility] Navigation Nurses at  [phone].  Messages received after hours will be returned the following business day.      In the case of an emergency, please dial 911 or seek assistance at the nearest Emergency Room/Urgent Care facility.     All Patients:    ? You may experience an increase in your symptoms for the first 2 days (It may take anywhere between 2 days- 2 weeks for the steroid to have maximum effect).    ? You may use ice on the injection site, as frequently as 20 minutes each hour if needed.    ? You may take your pain medicine.    ? You may continue taking your regular medication after your injection. If you have had a Medial Branch Block you may resume pain medication once your pain diary is completed.    ? You may shower. No swimming, tub bath or hot tub for 48 hours.  You may remove your bandaid/bandage as soon as you are home.    ? You may resume light activities, as tolerated.    ? Resume your usual diet as tolerated.    ? It is strongly advised that you do not drive for 1-3 hours post injection.    ? If you have had oral sedation:  Do not drive for 8 hours post injection.      ? If you have had IV sedation:  Do not drive for 24 hours post injection.  Do not operate hazardous machinery or make important personal/business decisions for 24 hours.      POSSIBLE STEROID SIDE EFFECTS (If steroid/cortisone was used for your procedure)    -If you experience these symptoms, it should only last for a short period      Swelling of the legs                Skin redness (flushing)       Mouth (oral) irritation     Blood sugar (glucose) levels              Sweats                      Mood changes    Headache    Sleeplessness    Weakened immune system for up to 14 days, which could increase the risk of jenna the COVID-19 virus and/or  experiencing more severe symptoms of the disease, if exposed.    Decreased effectiveness of the flu vaccine if given within 2 weeks of the steroid.         POSSIBLE PROCEDURE SIDE EFFECTS  -Call the Spine Center if you are concerned    Increased Pain             Increased numbness/tingling        Nausea/Vomiting            Bruising/bleeding at site        Redness or swelling                                                Difficulty walking        Weakness             Fever greater than 100.5    *In the event of a severe headache after an epidural steroid injection that is relieved by lying down, please call the [facility] to speak with a clinical staff member*

## 2021-06-27 ENCOUNTER — HEALTH MAINTENANCE LETTER (OUTPATIENT)
Age: 56
End: 2021-06-27

## 2021-06-29 ENCOUNTER — COMMUNICATION - HEALTHEAST (OUTPATIENT)
Dept: PHYSICAL MEDICINE AND REHAB | Facility: CLINIC | Age: 56
End: 2021-06-29

## 2021-06-29 DIAGNOSIS — M54.12 CERVICAL RADICULITIS: ICD-10-CM

## 2021-07-03 NOTE — ADDENDUM NOTE
Addendum Note by Ronny Summers MD at 3/17/2017 10:58 AM     Author: Ronny Summers MD Service: -- Author Type: Physician    Filed: 3/17/2017 10:58 AM Encounter Date: 3/17/2017 Status: Signed    : Ronny Summers MD (Physician)    Addended by: RONNY SUMMERS on: 3/17/2017 10:58 AM        Modules accepted: Orders

## 2021-07-04 NOTE — TELEPHONE ENCOUNTER
"PSP:  Dr. Saini  Last clinic visit:  6/22/2021 Video visit  Reason for call: Treatment plan  Clinical information:  Pt calling to inquire about next steps in her treatment plan. Pt is 11 days post Right C6-7 TFESI with Dr. Saini. She reports she is feeling better, although she is still experiencing numbness in her index finger, wrist soreness and arm pain.   Pt wondering what Dr. Saini thinks about scheduling \"the next injection\".  \"I'm just not sure if I should schedule the next injection that we talked about. I could cancel it if I'm feeling better. I don't want this to go on forever. Do I give it more time?\"  Advice given to patient: Informed pt that Dr. Saini will be updated with her status.   Provider to address: Please advise on next steps in treatment plan.       "

## 2021-07-04 NOTE — TELEPHONE ENCOUNTER
The patient is welcome to do what she feels comfortable with.  She can give the first injection more time or she can schedule the injection at the C6-7 level if she would like to proceed with that injection to see if it gives her more relief.  She pays out of pocket so it's really up to her if she wants to proceed with that based on her pain level and on her financial situation.

## 2021-07-04 NOTE — TELEPHONE ENCOUNTER
Call placed to pt with provider's response. Pt stated understanding. Pt states she would like to schedule the injection and she will cancel if she is feeling better. Order placed for Right C5-6 TFESI with Dr. Saini. Injection requirements reviewed. Transferred to scheduling to make appt.

## 2021-07-06 VITALS — WEIGHT: 108.2 LBS | BODY MASS INDEX: 19.17 KG/M2 | HEIGHT: 63 IN

## 2021-07-06 VITALS — BODY MASS INDEX: 19.31 KG/M2 | WEIGHT: 109 LBS | HEIGHT: 63 IN

## 2021-07-07 NOTE — TELEPHONE ENCOUNTER
Follow up call placed to check on status 1 week after Right C6-C7 TFESI per Dr. Saini. Patient was seen on 6/22/21 for pain management. Left message to return call.

## 2021-07-07 NOTE — TELEPHONE ENCOUNTER
"Patient returned call. Reports \"Things are going better. I have been doing a lot of neck PT exercises and continue with the antiinflammatories. There is still pain that goes into the fingers and hand.\" Reports about 30% better since the injection. Reviewed with pt that some patients can find relief as early as 2 days after an injection while other patients don't see relief for up to 2-3 weeks after an injection. Stated understanding. She would like to give it more time with this injection before deciding if wants the other injection or referral to neurosurgery.   "

## 2021-07-13 ENCOUNTER — RECORDS - HEALTHEAST (OUTPATIENT)
Dept: ADMINISTRATIVE | Facility: CLINIC | Age: 56
End: 2021-07-13

## 2021-07-21 ENCOUNTER — RECORDS - HEALTHEAST (OUTPATIENT)
Dept: ADMINISTRATIVE | Facility: CLINIC | Age: 56
End: 2021-07-21

## 2021-08-10 DIAGNOSIS — M54.12 CERVICAL RADICULITIS: Primary | ICD-10-CM

## 2021-08-13 ENCOUNTER — ANCILLARY PROCEDURE (OUTPATIENT)
Dept: PHYSICAL MEDICINE AND REHAB | Facility: CLINIC | Age: 56
End: 2021-08-13
Attending: PHYSICAL MEDICINE & REHABILITATION

## 2021-08-13 VITALS
OXYGEN SATURATION: 98 % | TEMPERATURE: 98 F | HEART RATE: 71 BPM | SYSTOLIC BLOOD PRESSURE: 84 MMHG | DIASTOLIC BLOOD PRESSURE: 62 MMHG

## 2021-08-13 DIAGNOSIS — M54.12 CERVICAL RADICULITIS: ICD-10-CM

## 2021-08-13 PROCEDURE — 64479 NJX AA&/STRD TFRM EPI C/T 1: CPT | Mod: RT | Performed by: PHYSICAL MEDICINE & REHABILITATION

## 2021-08-13 RX ORDER — DEXAMETHASONE SODIUM PHOSPHATE 10 MG/ML
INJECTION, SOLUTION INTRAMUSCULAR; INTRAVENOUS
Status: COMPLETED | OUTPATIENT
Start: 2021-08-13 | End: 2021-08-13

## 2021-08-13 RX ORDER — LIDOCAINE HYDROCHLORIDE 10 MG/ML
INJECTION, SOLUTION EPIDURAL; INFILTRATION; INTRACAUDAL; PERINEURAL
Status: COMPLETED | OUTPATIENT
Start: 2021-08-13 | End: 2021-08-13

## 2021-08-13 RX ADMIN — DEXAMETHASONE SODIUM PHOSPHATE 10 MG: 10 INJECTION, SOLUTION INTRAMUSCULAR; INTRAVENOUS at 14:07

## 2021-08-13 RX ADMIN — LIDOCAINE HYDROCHLORIDE 1 ML: 10 INJECTION, SOLUTION EPIDURAL; INFILTRATION; INTRACAUDAL; PERINEURAL at 14:06

## 2021-08-13 ASSESSMENT — PAIN SCALES - GENERAL
PAINLEVEL: MILD PAIN (2)
PAINLEVEL: MILD PAIN (2)

## 2021-08-13 NOTE — PATIENT INSTRUCTIONS
DISCHARGE INSTRUCTIONS    During office hours (8:00 a.m.- 4:00 p.m.) questions or concerns may be answered  by calling [facility] Nurses at  [phone].  Messages received after hours will be returned the following business day.      In the case of an emergency, please dial 911 or seek assistance at the nearest Emergency Room/Urgent Care facility.     All Patients:  ? You may experience an increase in your symptoms for the first 2 days (It may take anywhere between 2 days- 2 weeks for the steroid to have maximum effect).    ? You may use ice on the injection site, as frequently as 20 minutes each hour if needed.    ? You may take your pain medicine.    ? You may continue taking your regular medication.    ? You may shower. No swimming, tub bath or hot tub for 48 hours.  You may remove your bandaid/bandage as soon as you are home.    ? You may resume light activities, as tolerated.    ? Resume your usual diet as tolerated.    ? It is strongly advised that you do not drive for 1-3 hours post injection.    ? If you have had oral sedation:  Do not drive for 8 hours post injection.      ? If you have had IV sedation:  Do not drive for 24 hours post injection.  Do not operate hazardous machinery or make important personal/business decisions for 24 hours.    POSSIBLE STEROID SIDE EFFECTS (If steroid/cortisone was used for your procedure)    -If you experience these symptoms, it should only last for a short period      Swelling of the legs                Skin redness (flushing)       Mouth (oral) irritation     Blood sugar (glucose) levels              Sweats                     Mood changes    Headache    Weakened immune system for up to 14 days, which could increase the risk of jenna the COVID-19 virus and/or experiencing more severe symptoms of the disease, if exposed.         POSSIBLE PROCEDURE SIDE EFFECTS    -Call the [facility] if you are concerned      Increased Pain             Increased  numbness/tingling        Nausea/Vomiting            Bruising/bleeding at site        Redness or swelling                                                Difficulty walking        Weakness            Fever greater than 100.5    *In the event of a severe headache after an epidural steroid injection that is relieved by lying down, please call the [facility] to speak with a clinical staff member*

## 2021-10-17 ENCOUNTER — HEALTH MAINTENANCE LETTER (OUTPATIENT)
Age: 56
End: 2021-10-17

## 2022-07-20 ENCOUNTER — LAB REQUISITION (OUTPATIENT)
Dept: LAB | Facility: CLINIC | Age: 57
End: 2022-07-20

## 2022-07-20 DIAGNOSIS — Z13.220 ENCOUNTER FOR SCREENING FOR LIPOID DISORDERS: ICD-10-CM

## 2022-07-20 LAB
CHOLEST SERPL-MCNC: 171 MG/DL
HDLC SERPL-MCNC: 76 MG/DL
LDLC SERPL CALC-MCNC: 85 MG/DL
NONHDLC SERPL-MCNC: 95 MG/DL
TRIGL SERPL-MCNC: 51 MG/DL

## 2022-07-20 PROCEDURE — 80061 LIPID PANEL: CPT | Performed by: STUDENT IN AN ORGANIZED HEALTH CARE EDUCATION/TRAINING PROGRAM

## 2022-07-24 ENCOUNTER — HEALTH MAINTENANCE LETTER (OUTPATIENT)
Age: 57
End: 2022-07-24

## 2022-10-02 ENCOUNTER — HEALTH MAINTENANCE LETTER (OUTPATIENT)
Age: 57
End: 2022-10-02

## 2022-10-07 ENCOUNTER — HOSPITAL ENCOUNTER (OUTPATIENT)
Dept: MAMMOGRAPHY | Facility: CLINIC | Age: 57
Discharge: HOME | End: 2022-10-07
Attending: OBSTETRICS & GYNECOLOGY | Admitting: OBSTETRICS & GYNECOLOGY
Payer: COMMERCIAL

## 2022-10-07 DIAGNOSIS — Z12.31 VISIT FOR SCREENING MAMMOGRAM: ICD-10-CM

## 2022-10-07 PROCEDURE — 77067 SCR MAMMO BI INCL CAD: CPT

## 2022-11-17 ENCOUNTER — TRANSFERRED RECORDS (OUTPATIENT)
Dept: HEALTH INFORMATION MANAGEMENT | Facility: CLINIC | Age: 57
End: 2022-11-17

## 2023-02-07 ENCOUNTER — ANESTHESIA EVENT (OUTPATIENT)
Dept: SURGERY | Facility: CLINIC | Age: 58
End: 2023-02-07
Payer: COMMERCIAL

## 2023-02-08 ENCOUNTER — ANESTHESIA (OUTPATIENT)
Dept: SURGERY | Facility: CLINIC | Age: 58
End: 2023-02-08
Payer: COMMERCIAL

## 2023-02-08 ENCOUNTER — HOSPITAL ENCOUNTER (OUTPATIENT)
Facility: CLINIC | Age: 58
Discharge: HOME | End: 2023-02-08
Attending: OBSTETRICS & GYNECOLOGY | Admitting: OBSTETRICS & GYNECOLOGY
Payer: COMMERCIAL

## 2023-02-08 VITALS
BODY MASS INDEX: 19.88 KG/M2 | HEIGHT: 62 IN | WEIGHT: 108 LBS | OXYGEN SATURATION: 100 % | DIASTOLIC BLOOD PRESSURE: 59 MMHG | SYSTOLIC BLOOD PRESSURE: 106 MMHG | RESPIRATION RATE: 16 BRPM | HEART RATE: 59 BPM | TEMPERATURE: 98 F

## 2023-02-08 PROCEDURE — 272N000001 HC OR GENERAL SUPPLY STERILE: Performed by: OBSTETRICS & GYNECOLOGY

## 2023-02-08 PROCEDURE — 360N000076 HC SURGERY LEVEL 3, PER MIN: Performed by: OBSTETRICS & GYNECOLOGY

## 2023-02-08 PROCEDURE — 710N000012 HC RECOVERY PHASE 2, PER MINUTE: Performed by: OBSTETRICS & GYNECOLOGY

## 2023-02-08 PROCEDURE — 88305 TISSUE EXAM BY PATHOLOGIST: CPT | Mod: TC | Performed by: OBSTETRICS & GYNECOLOGY

## 2023-02-08 PROCEDURE — 250N000011 HC RX IP 250 OP 636: Performed by: OBSTETRICS & GYNECOLOGY

## 2023-02-08 PROCEDURE — 250N000011 HC RX IP 250 OP 636: Performed by: ANESTHESIOLOGY

## 2023-02-08 PROCEDURE — 250N000009 HC RX 250: Performed by: OBSTETRICS & GYNECOLOGY

## 2023-02-08 PROCEDURE — 999N000141 HC STATISTIC PRE-PROCEDURE NURSING ASSESSMENT: Performed by: OBSTETRICS & GYNECOLOGY

## 2023-02-08 PROCEDURE — 88305 TISSUE EXAM BY PATHOLOGIST: CPT | Mod: 26 | Performed by: PATHOLOGY

## 2023-02-08 PROCEDURE — 88342 IMHCHEM/IMCYTCHM 1ST ANTB: CPT | Mod: 26 | Performed by: PATHOLOGY

## 2023-02-08 PROCEDURE — 250N000013 HC RX MED GY IP 250 OP 250 PS 637: Performed by: OBSTETRICS & GYNECOLOGY

## 2023-02-08 PROCEDURE — 370N000017 HC ANESTHESIA TECHNICAL FEE, PER MIN: Performed by: OBSTETRICS & GYNECOLOGY

## 2023-02-08 PROCEDURE — 250N000011 HC RX IP 250 OP 636: Performed by: NURSE ANESTHETIST, CERTIFIED REGISTERED

## 2023-02-08 PROCEDURE — 88360 TUMOR IMMUNOHISTOCHEM/MANUAL: CPT | Mod: 26 | Performed by: PATHOLOGY

## 2023-02-08 PROCEDURE — 88341 IMHCHEM/IMCYTCHM EA ADD ANTB: CPT | Mod: 26 | Performed by: PATHOLOGY

## 2023-02-08 PROCEDURE — 258N000001 HC RX 258: Performed by: OBSTETRICS & GYNECOLOGY

## 2023-02-08 PROCEDURE — 258N000003 HC RX IP 258 OP 636

## 2023-02-08 RX ORDER — ACETAMINOPHEN 325 MG/1
975 TABLET ORAL ONCE
Status: COMPLETED | OUTPATIENT
Start: 2023-02-08 | End: 2023-02-08

## 2023-02-08 RX ORDER — ONDANSETRON 2 MG/ML
INJECTION INTRAMUSCULAR; INTRAVENOUS PRN
Status: DISCONTINUED | OUTPATIENT
Start: 2023-02-08 | End: 2023-02-08

## 2023-02-08 RX ORDER — CEFAZOLIN SODIUM/WATER 2 G/20 ML
2 SYRINGE (ML) INTRAVENOUS
Status: COMPLETED | OUTPATIENT
Start: 2023-02-08 | End: 2023-02-08

## 2023-02-08 RX ORDER — ACETAMINOPHEN 325 MG/1
975 TABLET ORAL ONCE
Status: CANCELLED | OUTPATIENT
Start: 2023-02-08 | End: 2023-02-08

## 2023-02-08 RX ORDER — SODIUM CHLORIDE, SODIUM LACTATE, POTASSIUM CHLORIDE, CALCIUM CHLORIDE 600; 310; 30; 20 MG/100ML; MG/100ML; MG/100ML; MG/100ML
INJECTION, SOLUTION INTRAVENOUS CONTINUOUS
Status: DISCONTINUED | OUTPATIENT
Start: 2023-02-08 | End: 2023-02-08 | Stop reason: HOSPADM

## 2023-02-08 RX ORDER — FENTANYL CITRATE 50 UG/ML
25 INJECTION, SOLUTION INTRAMUSCULAR; INTRAVENOUS
Status: DISCONTINUED | OUTPATIENT
Start: 2023-02-08 | End: 2023-02-08 | Stop reason: HOSPADM

## 2023-02-08 RX ORDER — LIDOCAINE HYDROCHLORIDE 10 MG/ML
INJECTION, SOLUTION EPIDURAL; INFILTRATION; INTRACAUDAL; PERINEURAL
Status: DISCONTINUED
Start: 2023-02-08 | End: 2023-02-08 | Stop reason: HOSPADM

## 2023-02-08 RX ORDER — IBUPROFEN 400 MG/1
800 TABLET, FILM COATED ORAL ONCE
Status: CANCELLED | OUTPATIENT
Start: 2023-02-08 | End: 2023-02-08

## 2023-02-08 RX ORDER — PROPOFOL 10 MG/ML
INJECTION, EMULSION INTRAVENOUS CONTINUOUS PRN
Status: DISCONTINUED | OUTPATIENT
Start: 2023-02-08 | End: 2023-02-08

## 2023-02-08 RX ORDER — LIDOCAINE HYDROCHLORIDE 10 MG/ML
INJECTION, SOLUTION INFILTRATION; PERINEURAL PRN
Status: DISCONTINUED | OUTPATIENT
Start: 2023-02-08 | End: 2023-02-08 | Stop reason: HOSPADM

## 2023-02-08 RX ORDER — OXYCODONE HYDROCHLORIDE 5 MG/1
5 TABLET ORAL EVERY 4 HOURS PRN
Status: DISCONTINUED | OUTPATIENT
Start: 2023-02-08 | End: 2023-02-08 | Stop reason: HOSPADM

## 2023-02-08 RX ORDER — CEFAZOLIN SODIUM/WATER 2 G/20 ML
2 SYRINGE (ML) INTRAVENOUS SEE ADMIN INSTRUCTIONS
Status: DISCONTINUED | OUTPATIENT
Start: 2023-02-08 | End: 2023-02-08 | Stop reason: HOSPADM

## 2023-02-08 RX ORDER — LIDOCAINE 40 MG/G
CREAM TOPICAL
Status: DISCONTINUED | OUTPATIENT
Start: 2023-02-08 | End: 2023-02-08 | Stop reason: HOSPADM

## 2023-02-08 RX ADMIN — MIDAZOLAM 2 MG: 1 INJECTION INTRAMUSCULAR; INTRAVENOUS at 12:11

## 2023-02-08 RX ADMIN — PROPOFOL 150 MCG/KG/MIN: 10 INJECTION, EMULSION INTRAVENOUS at 12:12

## 2023-02-08 RX ADMIN — FENTANYL CITRATE 50 MCG: 50 INJECTION, SOLUTION INTRAMUSCULAR; INTRAVENOUS at 12:16

## 2023-02-08 RX ADMIN — FENTANYL CITRATE 50 MCG: 50 INJECTION, SOLUTION INTRAMUSCULAR; INTRAVENOUS at 12:23

## 2023-02-08 RX ADMIN — Medication 2 G: at 12:05

## 2023-02-08 RX ADMIN — PROPOFOL 40 MG: 10 INJECTION, EMULSION INTRAVENOUS at 12:11

## 2023-02-08 RX ADMIN — ACETAMINOPHEN 975 MG: 325 TABLET ORAL at 10:54

## 2023-02-08 RX ADMIN — SODIUM CHLORIDE, POTASSIUM CHLORIDE, SODIUM LACTATE AND CALCIUM CHLORIDE 1000 ML: 600; 310; 30; 20 INJECTION, SOLUTION INTRAVENOUS at 11:04

## 2023-02-08 RX ADMIN — ONDANSETRON 4 MG: 2 INJECTION INTRAMUSCULAR; INTRAVENOUS at 12:23

## 2023-02-08 ASSESSMENT — ACTIVITIES OF DAILY LIVING (ADL)
ADLS_ACUITY_SCORE: 35
ADLS_ACUITY_SCORE: 35

## 2023-02-08 NOTE — ANESTHESIA PREPROCEDURE EVALUATION
Anesthesia Pre-Procedure Evaluation    Patient: Elma Jorgensen   MRN: [id number] : 1965        Procedure : Procedure(s):  HYSTEROSCOPY, DILATION AND CURETTAGE          No past medical history on file.   Past Surgical History:   Procedure Laterality Date     NO PAST SURGERIES        No Known Allergies   Social History     Tobacco Use     Smoking status: Never     Smokeless tobacco: Never   Substance Use Topics     Alcohol use: Not on file      Wt Readings from Last 1 Encounters:   17 53 kg (116 lb 14.4 oz)        Anesthesia Evaluation   Pt has had prior anesthetic. Type of anesthetic: WTE.    No history of anesthetic complications       ROS/MED HX  ENT/Pulmonary:  - neg pulmonary ROS     Neurologic: Comment: Cervicalgia, UE paresthesias R>L    (+) peripheral neuropathy,     Cardiovascular:  - neg cardiovascular ROS     METS/Exercise Tolerance: >4 METS    Hematologic:  - neg hematologic  ROS     Musculoskeletal:  - neg musculoskeletal ROS     GI/Hepatic:  - neg GI/hepatic ROS     Renal/Genitourinary:  - neg Renal ROS     Endo:  - neg endo ROS     Psychiatric/Substance Use:  - neg psychiatric ROS     Infectious Disease:  - neg infectious disease ROS     Malignancy:       Other:            Physical Exam    Airway        Mallampati: II   TM distance: > 3 FB   Neck ROM: full     Respiratory Devices and Support         Dental       (+) Minor Abnormalities - some fillings, tiny chips      Cardiovascular          Rhythm and rate: regular     Pulmonary           breath sounds clear to auscultation           OUTSIDE LABS:  CBC: No results found for: WBC, HGB, HCT, PLT  BMP: No results found for: NA, POTASSIUM, CHLORIDE, CO2, BUN, CR, GLC  COAGS: No results found for: PTT, INR, FIBR  POC: No results found for: BGM, HCG, HCGS  HEPATIC: No results found for: ALBUMIN, PROTTOTAL, ALT, AST, GGT, ALKPHOS, BILITOTAL, BILIDIRECT, KEN  OTHER: No results found for: PH, LACT, A1C, IESHA, PHOS, MAG, LIPASE, AMYLASE, TSH, T4,  T3, CRP, SED    Anesthesia Plan    ASA Status:  2   NPO Status:  NPO Appropriate    Anesthesia Type: MAC.   Induction: N/a.   Maintenance: TIVA.        Consents    Anesthesia Plan(s) and associated risks, benefits, and realistic alternatives discussed. Questions answered and patient/representative(s) expressed understanding.     - Discussed: Risks, Benefits and Alternatives for the PROCEDURE were discussed     - Discussed with:  Patient      - Patient is DNR/DNI Status: No         Postoperative Care    Pain management: Multi-modal analgesia.   PONV prophylaxis: Ondansetron (or other 5HT-3), Dexamethasone or Solumedrol     Comments:                Latrice Brown MD

## 2023-02-08 NOTE — ANESTHESIA POSTPROCEDURE EVALUATION
Patient: Elma Jorgensen    Procedure: Procedure(s):  HYSTEROSCOPY, DILATION AND CURETTAGE       Anesthesia Type:  MAC    Note:     Postop Pain Control: Uneventful            Sign Out: Well controlled pain   PONV: No   Neuro/Psych: Uneventful            Sign Out: Acceptable/Baseline neuro status   Airway/Respiratory: Uneventful            Sign Out: Acceptable/Baseline resp. status   CV/Hemodynamics: Uneventful            Sign Out: Acceptable CV status; No obvious hypovolemia; No obvious fluid overload   Other NRE: NONE   DID A NON-ROUTINE EVENT OCCUR? No           Last vitals:  Vitals Value Taken Time   /59 02/08/23 1324   Temp 36.2  C (97.2  F) 02/08/23 1241   Pulse 57 02/08/23 1325   Resp 16 02/08/23 1241   SpO2 100 % 02/08/23 1326   Vitals shown include unvalidated device data.    Electronically Signed By: Latrice Brown MD  February 8, 2023  1:31 PM

## 2023-02-08 NOTE — ANESTHESIA CARE TRANSFER NOTE
Patient: Elma Jorgensen    Procedure: Procedure(s):  HYSTEROSCOPY, DILATION AND CURETTAGE       Diagnosis: Abnormal uterine bleeding [N93.9]  Diagnosis Additional Information: No value filed.    Anesthesia Type:   MAC     Note:    Oropharynx: oropharynx clear of all foreign objects  Level of Consciousness: awake  Oxygen Supplementation: room air    Independent Airway: airway patency satisfactory and stable  Dentition: dentition unchanged  Vital Signs Stable: post-procedure vital signs reviewed and stable  Report to RN Given: handoff report given  Patient transferred to: Phase II    Handoff Report: Identifed the Patient, Identified the Reponsible Provider, Reviewed the pertinent medical history, Discussed the surgical course, Reviewed Intra-OP anesthesia mangement and issues during anesthesia, Set expectations for post-procedure period and Allowed opportunity for questions and acknowledgement of understanding      Vitals:  Vitals Value Taken Time   BP 97/53 02/08/23 1243   Temp 97.2f 02/08/23 1243   Pulse 67 02/08/23 1243   Resp 16 02/08/2023 1243   SpO2 95 % 02/08/23 1243   Vitals shown include unvalidated device data.    Electronically Signed By: DARLYN ARDON CRNA  February 8, 2023  12:45 PM

## 2023-02-08 NOTE — H&P
H&P from primary MD reviewed, currently in the paper chart. The procedure and consent were reviewed. To proceed as planned.

## 2023-02-08 NOTE — OP NOTE
Operative Note    Name:  lEma Jorgensen  PCP:  Brenda Ryan  Procedure Date:  2/8/2023      Pre-Procedure Diagnosis:  Abnormal uterine bleeding [N93.9]     Post-Procedure Diagnosis:    Same     Procedure(s):  HYSTEROSCOPY, DILATION AND CURETTAGE     Surgeon: Ysabel Lovett MD    Assistant: Staff    Anesthesia Type:  MAC with Local     Findings:  Endometrial polyp    Complications:    None    Specimens:    ID Type Source Tests Collected by Time Destination   1 : ENDOMETRIAL CURETTINGS WITH POLYP Tissue Endometrium SURGICAL PATHOLOGY EXAM Ysabel Lovett MD 2/8/2023 12:29 PM           Lines, Drains, Airways:       Estimated Blood Loss:   3 ccs    Operative Report:    After the patient was consented for surgery she was transferred into the operating room and placed on the table in the dorsal supine position.  She underwent an induction of anesthesia and then was repositioned into the dorsolithotomy position using Vel stirrups to support the lower extremities.  Her abdominal perineal and vaginal areas were sterilely prepped and draped for surgery.  A pause to correctly identify the patient and the case      She was straight cathed for approximately 200 cc of clear yellow urine.  A bimanual examination was performed notable for a anteverted uterus there were no adnexal masses palpable.  The bivalve speculum was placed into the vaginal vault.  The cervix was visualized and grasped with a single-tooth tenaculum.  The endocervical canal was dilated with a series of prior dilators.  The hysteroscope was then inserted using normal saline as a distention medium.  Upon hysteroscopy and endometrial polyp was noted.  There were no other significant findings.  Both tubal ostia were visualized.  The endocervical canal was dilated to 8.5 mm.  A small banjo curette and a small Delmar stone biopsy forcep was utilized to grasp and remove the endometrial polyp.  The endometrium was curettaged with a  aubrey curette.  The tissue removed was sent to pathology for further evaluation.  Postprocedure there was not any significant bleeding.  The single-tooth tenaculum was removed and the puncture points were hemostatic.  The bivalve speculum was then removed.  The patient was repositioned back into the dorsal supine position reversed and transferred to the PACU in stable condition.   Sponge and needle and instrument counts were correct.    Ysabel Lovett MD    Date: 2/8/2023  Time: 12:41 PM

## 2023-02-10 LAB
PATH REPORT.COMMENTS IMP SPEC: NORMAL
PATH REPORT.FINAL DX SPEC: NORMAL
PATH REPORT.GROSS SPEC: NORMAL
PATH REPORT.MICROSCOPIC SPEC OTHER STN: NORMAL
PATH REPORT.RELEVANT HX SPEC: NORMAL
PHOTO IMAGE: NORMAL

## 2023-08-12 ENCOUNTER — HEALTH MAINTENANCE LETTER (OUTPATIENT)
Age: 58
End: 2023-08-12

## 2024-03-21 ENCOUNTER — HOSPITAL ENCOUNTER (OUTPATIENT)
Dept: MAMMOGRAPHY | Facility: CLINIC | Age: 59
Discharge: HOME | End: 2024-03-21
Attending: OBSTETRICS & GYNECOLOGY | Admitting: OBSTETRICS & GYNECOLOGY
Payer: COMMERCIAL

## 2024-03-21 DIAGNOSIS — Z12.31 VISIT FOR SCREENING MAMMOGRAM: ICD-10-CM

## 2024-03-21 PROCEDURE — 77063 BREAST TOMOSYNTHESIS BI: CPT

## 2024-10-05 ENCOUNTER — HEALTH MAINTENANCE LETTER (OUTPATIENT)
Age: 59
End: 2024-10-05

## 2025-04-04 ENCOUNTER — ANCILLARY ORDERS (OUTPATIENT)
Dept: MAMMOGRAPHY | Facility: CLINIC | Age: 60
End: 2025-04-04
Payer: COMMERCIAL

## 2025-04-04 DIAGNOSIS — Z12.31 VISIT FOR SCREENING MAMMOGRAM: Primary | ICD-10-CM

## 2025-05-02 ENCOUNTER — HOSPITAL ENCOUNTER (OUTPATIENT)
Dept: MAMMOGRAPHY | Facility: CLINIC | Age: 60
Discharge: HOME | End: 2025-05-02
Attending: OBSTETRICS & GYNECOLOGY | Admitting: OBSTETRICS & GYNECOLOGY
Payer: COMMERCIAL

## 2025-05-02 DIAGNOSIS — Z12.31 VISIT FOR SCREENING MAMMOGRAM: ICD-10-CM

## 2025-05-02 PROCEDURE — 77063 BREAST TOMOSYNTHESIS BI: CPT

## 2025-05-14 ENCOUNTER — PATIENT OUTREACH (OUTPATIENT)
Dept: CARE COORDINATION | Facility: CLINIC | Age: 60
End: 2025-05-14
Payer: COMMERCIAL

## (undated) DEVICE — DRAPE UNDER BUTTOCK 89415

## (undated) DEVICE — CUSTOM PACK PERI GYN SMA5BPGHEA

## (undated) DEVICE — SOL WATER IRRIG 1000ML BOTTLE 2F7114

## (undated) DEVICE — PREP DYNA-HEX 4% CHG SCRUB 4OZ BOTTLE MDS098710

## (undated) DEVICE — KIT PROCEDURE FLUENT IN/OUT FLOWPAK TISS TRAP FLT-112S

## (undated) DEVICE — GLOVE SURG PI ULTRA TOUCH M 5-1/2 LF

## (undated) DEVICE — NEEDLE HYPO 18X1-1/2 SAFETY 305918

## (undated) DEVICE — GOWN LG DISP 9515

## (undated) DEVICE — SYR 20ML LL W/O NDL 302830

## (undated) DEVICE — SEAL SET MYOSURE ROD LENS SCOPE SINGLE USE 40-902

## (undated) DEVICE — PACK MINOR SINGLE BASIN SSK3001

## (undated) DEVICE — DRSG TELFA 3X4" 1050

## (undated) RX ORDER — FENTANYL CITRATE 50 UG/ML
INJECTION, SOLUTION INTRAMUSCULAR; INTRAVENOUS
Status: DISPENSED
Start: 2023-02-08

## (undated) RX ORDER — ONDANSETRON 2 MG/ML
INJECTION INTRAMUSCULAR; INTRAVENOUS
Status: DISPENSED
Start: 2023-02-08

## (undated) RX ORDER — PROPOFOL 10 MG/ML
INJECTION, EMULSION INTRAVENOUS
Status: DISPENSED
Start: 2023-02-08

## (undated) RX ORDER — DEXAMETHASONE SODIUM PHOSPHATE 10 MG/ML
INJECTION, EMULSION INTRAMUSCULAR; INTRAVENOUS
Status: DISPENSED
Start: 2023-02-08